# Patient Record
(demographics unavailable — no encounter records)

---

## 2025-03-25 NOTE — HISTORY OF PRESENT ILLNESS
[Derm Symptoms] : DERM SYMPTOMS [FreeTextEntry6] : Gopi is here for rash concern, noticed when he woke up this AM, nothing new in terms of food/med and also no sick symptoms, feels completely fine, no one else sick in family currently

## 2025-03-25 NOTE — DISCUSSION/SUMMARY
[FreeTextEntry1] : suspect parvovirus/5th disease related nature and course of illness discussed and supportive treatment discussed zyrtec 10mg PRN counseled warm temp/heat/showering can temporarily make rash look worse

## 2025-03-25 NOTE — PHYSICAL EXAM
[Clear] : right tympanic membrane clear [NL] : warm, clear [de-identified] : diffuse lace harlequin rash noted on arms/legs and blanches on pressure